# Patient Record
Sex: FEMALE | Race: WHITE | NOT HISPANIC OR LATINO | Employment: STUDENT | URBAN - METROPOLITAN AREA
[De-identification: names, ages, dates, MRNs, and addresses within clinical notes are randomized per-mention and may not be internally consistent; named-entity substitution may affect disease eponyms.]

---

## 2018-10-16 ENCOUNTER — HISTORICAL (OUTPATIENT)
Dept: ADMINISTRATIVE | Facility: HOSPITAL | Age: 23
End: 2018-10-16

## 2018-10-16 LAB
HAV IGM SERPL QL IA: NONREACTIVE
HBV CORE IGM SERPL QL IA: NONREACTIVE
HBV SURFACE AG SERPL QL IA: NEGATIVE
HCV AB SERPL QL IA: NONREACTIVE
HIV 1+2 AB+HIV1 P24 AG SERPL QL IA: NONREACTIVE
POC BETA-HCG (QUAL): NEGATIVE
T PALLIDUM AB SER QL: NONREACTIVE

## 2019-06-13 ENCOUNTER — HISTORICAL (OUTPATIENT)
Dept: ADMINISTRATIVE | Facility: HOSPITAL | Age: 24
End: 2019-06-13

## 2019-06-13 LAB — B-HCG SERPL QL: NEGATIVE

## 2020-01-29 LAB — POC BETA-HCG (QUAL): NEGATIVE

## 2022-04-11 ENCOUNTER — HISTORICAL (OUTPATIENT)
Dept: ADMINISTRATIVE | Facility: HOSPITAL | Age: 27
End: 2022-04-11
Payer: MEDICAID

## 2022-04-29 VITALS
SYSTOLIC BLOOD PRESSURE: 107 MMHG | BODY MASS INDEX: 39.13 KG/M2 | HEIGHT: 61 IN | DIASTOLIC BLOOD PRESSURE: 78 MMHG | WEIGHT: 207.25 LBS

## 2022-09-21 ENCOUNTER — HISTORICAL (OUTPATIENT)
Dept: ADMINISTRATIVE | Facility: HOSPITAL | Age: 27
End: 2022-09-21
Payer: MEDICAID

## 2023-07-18 ENCOUNTER — OFFICE VISIT (OUTPATIENT)
Dept: GYNECOLOGY | Facility: CLINIC | Age: 28
End: 2023-07-18
Payer: MEDICAID

## 2023-07-18 VITALS
OXYGEN SATURATION: 100 % | TEMPERATURE: 98 F | DIASTOLIC BLOOD PRESSURE: 75 MMHG | SYSTOLIC BLOOD PRESSURE: 109 MMHG | HEIGHT: 61 IN | HEART RATE: 76 BPM | BODY MASS INDEX: 44.25 KG/M2 | WEIGHT: 234.38 LBS | RESPIRATION RATE: 20 BRPM

## 2023-07-18 DIAGNOSIS — Z11.3 ROUTINE SCREENING FOR STI (SEXUALLY TRANSMITTED INFECTION): ICD-10-CM

## 2023-07-18 DIAGNOSIS — L68.0 HIRSUTISM: ICD-10-CM

## 2023-07-18 DIAGNOSIS — Z01.419 WOMEN'S ANNUAL ROUTINE GYNECOLOGICAL EXAMINATION: Primary | ICD-10-CM

## 2023-07-18 DIAGNOSIS — L70.9 ACNE, UNSPECIFIED ACNE TYPE: ICD-10-CM

## 2023-07-18 DIAGNOSIS — Z30.9 ENCOUNTER FOR CONTRACEPTIVE MANAGEMENT, UNSPECIFIED TYPE: ICD-10-CM

## 2023-07-18 LAB
B-HCG UR QL: NEGATIVE
C TRACH DNA SPEC QL NAA+PROBE: NOT DETECTED
CLUE CELLS VAG QL WET PREP: NORMAL
CTP QC/QA: YES
N GONORRHOEA DNA SPEC QL NAA+PROBE: NOT DETECTED
SOURCE (OHS): NORMAL
T VAGINALIS VAG QL WET PREP: NORMAL
WBC #/AREA VAG WET PREP: NORMAL
YEAST SPEC QL WET PREP: NORMAL

## 2023-07-18 PROCEDURE — 3078F DIAST BP <80 MM HG: CPT | Mod: CPTII,,, | Performed by: NURSE PRACTITIONER

## 2023-07-18 PROCEDURE — 1160F RVW MEDS BY RX/DR IN RCRD: CPT | Mod: CPTII,,, | Performed by: NURSE PRACTITIONER

## 2023-07-18 PROCEDURE — 99214 OFFICE O/P EST MOD 30 MIN: CPT | Mod: PBBFAC | Performed by: NURSE PRACTITIONER

## 2023-07-18 PROCEDURE — 3074F PR MOST RECENT SYSTOLIC BLOOD PRESSURE < 130 MM HG: ICD-10-PCS | Mod: CPTII,,, | Performed by: NURSE PRACTITIONER

## 2023-07-18 PROCEDURE — 87591 N.GONORRHOEAE DNA AMP PROB: CPT | Performed by: NURSE PRACTITIONER

## 2023-07-18 PROCEDURE — 1159F MED LIST DOCD IN RCRD: CPT | Mod: CPTII,,, | Performed by: NURSE PRACTITIONER

## 2023-07-18 PROCEDURE — 99395 PREV VISIT EST AGE 18-39: CPT | Mod: S$PBB,,, | Performed by: NURSE PRACTITIONER

## 2023-07-18 PROCEDURE — 1160F PR REVIEW ALL MEDS BY PRESCRIBER/CLIN PHARMACIST DOCUMENTED: ICD-10-PCS | Mod: CPTII,,, | Performed by: NURSE PRACTITIONER

## 2023-07-18 PROCEDURE — 87210 SMEAR WET MOUNT SALINE/INK: CPT | Performed by: NURSE PRACTITIONER

## 2023-07-18 PROCEDURE — 99395 PR PREVENTIVE VISIT,EST,18-39: ICD-10-PCS | Mod: S$PBB,,, | Performed by: NURSE PRACTITIONER

## 2023-07-18 PROCEDURE — 81025 URINE PREGNANCY TEST: CPT | Mod: PBBFAC | Performed by: NURSE PRACTITIONER

## 2023-07-18 PROCEDURE — 3074F SYST BP LT 130 MM HG: CPT | Mod: CPTII,,, | Performed by: NURSE PRACTITIONER

## 2023-07-18 PROCEDURE — 3078F PR MOST RECENT DIASTOLIC BLOOD PRESSURE < 80 MM HG: ICD-10-PCS | Mod: CPTII,,, | Performed by: NURSE PRACTITIONER

## 2023-07-18 PROCEDURE — 3008F BODY MASS INDEX DOCD: CPT | Mod: CPTII,,, | Performed by: NURSE PRACTITIONER

## 2023-07-18 PROCEDURE — 1159F PR MEDICATION LIST DOCUMENTED IN MEDICAL RECORD: ICD-10-PCS | Mod: CPTII,,, | Performed by: NURSE PRACTITIONER

## 2023-07-18 PROCEDURE — 3008F PR BODY MASS INDEX (BMI) DOCUMENTED: ICD-10-PCS | Mod: CPTII,,, | Performed by: NURSE PRACTITIONER

## 2023-07-18 RX ORDER — SPIRONOLACTONE 50 MG/1
TABLET, FILM COATED ORAL
COMMUNITY
Start: 2023-05-16

## 2023-07-18 RX ORDER — DROSPIRENONE AND ETHINYL ESTRADIOL 0.03MG-3MG
1 KIT ORAL DAILY
Qty: 30 TABLET | Refills: 11 | Status: SHIPPED | OUTPATIENT
Start: 2023-07-18 | End: 2024-07-17

## 2023-07-18 RX ORDER — DEXTROAMPHETAMINE SACCHARATE, AMPHETAMINE ASPARTATE MONOHYDRATE, DEXTROAMPHETAMINE SULFATE AND AMPHETAMINE SULFATE 1.25; 1.25; 1.25; 1.25 MG/1; MG/1; MG/1; MG/1
1 CAPSULE, EXTENDED RELEASE ORAL EVERY MORNING
COMMUNITY

## 2023-07-18 NOTE — LETTER
July 18, 2023      [facility]  [address] [facility] [address]  Phone: [phone]       Patient: Osvaldo Spencer   YOB: 1995  Date of Visit: 07/18/2023    To Whom It May Concern:    Olaf Spencer  was at [facility] on 07/18/2023. The patient may return to work/school on 07/18/2023 with no restrictions. If you have any questions or concerns, or if I can be of further assistance, please do not hesitate to contact me.    Sincerely,    Martha Khan MA

## 2023-07-18 NOTE — PROGRESS NOTES
"Patient ID: Osvaldo Spencer is a 27 y.o. female.    Chief Complaint: Gynecologic Exam      Review of patient's allergies indicates:  No Known Allergies      HPI:  Pt is G0 here for annual gyn exam. Last pap 2/2022=NIL. LMP 6/28/2023. Pt has regular monthly periods lasting 4 days and changes pad every 2-3 hrs. Denies abd/pelvic pain. Pt was on martha for contraception but states recently started on ozempic injections for weight loss (seeing provider in [place]) and she was not sure if she could continue OCPs. LMP 6/28/2023. States has not been sexually active since January. At last visit, pt reported hirsutism and her DHEAS was mildy elevated-[id number]. We ordered CT but it was denied by her insurance. She is requesting referral to dermatology as her previous provider does not accept her insurance.       Review of Systems:   Negative except for findings in HPI     Objective:   /75 (BP Location: Left arm, Patient Position: Sitting, BP Method: Medium (Automatic))   Pulse 76   Temp 98.4 °F (36.9 °C) (Oral)   Resp 20   Ht 5' 1" (1.549 m)   Wt 106.3 kg (234 lb 6.4 oz)   LMP 06/28/2023   SpO2 100%   BMI 44.29 kg/m²    Physical Exam:  GENERAL: Pt is aware and alert and  in no acute distress.  BREASTS: Bilateral-No masses, nipple discharge, skin changes, or tenderness.  ABDOMEN: Soft, non tender.  VULVA:  No lesions or skin changes.  URETHRA: No lesions  BLADDER: No tenderness.  VAGINA: Mucosa normal,no discharge; no lesions.  CERVIX:  no CMT, NO discharge; NO lesions  BIMANUAL EXAM: The uterus is mobile, nontender, no palpable masses. Aris adnexa reveal no evidence of masses; no fullness   SKIN: Warm and Dry. Scattered pustules to face  PSYCHIATRIC: Patient is awake and alert. Mood and affect are normal.    Assessment:   Women's annual routine gynecological examination  -     POCT urine pregnancy    Routine screening for STI (sexually transmitted infection)  -     Wet Prep, Genital  -     SYPHILIS ANTIBODY (WITH REFLEX " RPR); Future; Expected date: 07/18/2023  -     HIV 1/2 Ag/Ab (4th Gen); Future; Expected date: 07/18/2023  -     Chlamydia/GC, PCR  -     Hepatitis B Surface Antigen; Future; Expected date: 07/18/2023  -     Hepatitis C Antibody; Future; Expected date: 07/18/2023    Hirsutism  -     DHEA-Sulfate; Future; Expected date: 07/18/2023  -     Ambulatory referral/consult to Dermatology; Future; Expected date: 10/18/2023    Acne, unspecified acne type  -     DHEA-Sulfate; Future; Expected date: 07/18/2023  -     Ambulatory referral/consult to Dermatology; Future; Expected date: 10/18/2023            1. Women's annual routine gynecological examination    2. Routine screening for STI (sexually transmitted infection)    3. Hirsutism    4. Acne, unspecified acne type             -pelvic; pap UTD per ACOG guidelines  -interaction check done with martha and ozempic on epocrates.  Some studies show ozempic could affect the  absorption or oral medications but it is not thought enough to affect or cause break through ovulation. I informed her of these findings and encouraged her to use addition contraception (abstinence/condoms) while taking ozempic. I also discussed teratogenic effects that both ozempic and spironolactone can cause. She verbalized understanding  -will repeat DHEAS  Plan:       Follow up in about 1 year (around 7/18/2024).

## 2023-10-25 ENCOUNTER — PATIENT MESSAGE (OUTPATIENT)
Dept: GYNECOLOGY | Facility: CLINIC | Age: 28
End: 2023-10-25
Payer: MEDICAID

## 2023-10-26 ENCOUNTER — TELEPHONE (OUTPATIENT)
Dept: GYNECOLOGY | Facility: CLINIC | Age: 28
End: 2023-10-26
Payer: MEDICAID

## 2023-10-26 NOTE — TELEPHONE ENCOUNTER
Pt left a message requesting STI testing called pt on yesterday received no answer. Pt portal is active, pt last login was 10/25/23 at 12:05pm. Sent a message the portal on yesterday(10/25/23) to contact the clinic message was read by pt at 12:05pm on 10/25/23.     Called pt received no answer LVM to contact the clinic